# Patient Record
Sex: FEMALE | Race: WHITE | NOT HISPANIC OR LATINO | Employment: STUDENT | URBAN - METROPOLITAN AREA
[De-identification: names, ages, dates, MRNs, and addresses within clinical notes are randomized per-mention and may not be internally consistent; named-entity substitution may affect disease eponyms.]

---
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
## 2024-03-23 ENCOUNTER — APPOINTMENT (OUTPATIENT)
Dept: PEDIATRICS | Facility: CLINIC | Age: 19
End: 2024-03-23
Payer: COMMERCIAL

## 2024-04-05 ENCOUNTER — OFFICE VISIT (OUTPATIENT)
Dept: PRIMARY CARE | Facility: CLINIC | Age: 19
End: 2024-04-05
Payer: COMMERCIAL

## 2024-04-05 VITALS
SYSTOLIC BLOOD PRESSURE: 113 MMHG | TEMPERATURE: 97.6 F | HEIGHT: 64 IN | HEART RATE: 116 BPM | WEIGHT: 149 LBS | BODY MASS INDEX: 25.44 KG/M2 | DIASTOLIC BLOOD PRESSURE: 68 MMHG | OXYGEN SATURATION: 99 %

## 2024-04-05 DIAGNOSIS — J02.9 PHARYNGITIS, UNSPECIFIED ETIOLOGY: Primary | ICD-10-CM

## 2024-04-05 DIAGNOSIS — R21 RASH: ICD-10-CM

## 2024-04-05 LAB — POC RAPID STREP: NEGATIVE

## 2024-04-05 PROCEDURE — 99203 OFFICE O/P NEW LOW 30 MIN: CPT | Performed by: INTERNAL MEDICINE

## 2024-04-05 PROCEDURE — 87081 CULTURE SCREEN ONLY: CPT

## 2024-04-05 PROCEDURE — 87880 STREP A ASSAY W/OPTIC: CPT | Performed by: INTERNAL MEDICINE

## 2024-04-05 PROCEDURE — 86665 EPSTEIN-BARR CAPSID VCA: CPT

## 2024-04-05 PROCEDURE — 86664 EPSTEIN-BARR NUCLEAR ANTIGEN: CPT

## 2024-04-05 PROCEDURE — 80053 COMPREHEN METABOLIC PANEL: CPT

## 2024-04-05 PROCEDURE — 87389 HIV-1 AG W/HIV-1&-2 AB AG IA: CPT

## 2024-04-05 PROCEDURE — 87800 DETECT AGNT MULT DNA DIREC: CPT

## 2024-04-05 PROCEDURE — 86663 EPSTEIN-BARR ANTIBODY: CPT

## 2024-04-05 PROCEDURE — 85025 COMPLETE CBC W/AUTO DIFF WBC: CPT

## 2024-04-05 PROCEDURE — 36415 COLL VENOUS BLD VENIPUNCTURE: CPT

## 2024-04-05 NOTE — PROGRESS NOTES
"Subjective   Rissa Ying is a 19 y.o. female who presents for New Patient Visit.  HPI  Accompanied by her mom.    Past medical history includes recurrent right lower extremity SVT, chronic sinus tachycardia and headaches.    She is concerned about recurrent sore throat.  She went to the [facility] about 2 weeks ago on 3/20/2024 after her 2 days of fever, aches, fatigue, odynophagia and sore throat.  She tested negative for influenza, strep and COVID.  Nevertheless, she was prescribed 10 days of amoxicillin.  After about 4 days she started noticing some improvement but then developed small spots all over her body with some itching which resolved with Benadryl.    This past Monday and Tuesday her throat started getting little bit worse with some visible exudate on her tonsils again and she is concerned about recurrence.  Not nearly as bad as it was and possibly better or stable from when it started on Monday or Tuesday, has some mild discomfort with eating.  This time does not feel sick, no systemic symptoms at all.  Objective   /68   Pulse (!) 116   Temp 36.4 °C (97.6 °F)   Ht 1.613 m (5' 3.5\")   Wt 67.6 kg (149 lb)   SpO2 99%   BMI 25.98 kg/m²    Physical Exam  NAD, pleasant, nontoxic-appearing  No nasal congestion, conjunctivitis, sinus pain or warmth  Enlarged tonsils with some minimal exudate, no significant oropharyngeal erythema  No cervical lymphadenopathy, neck supple  Lungs clear to auscultation  Heart is regular but tachycardic  No visible rashes  Assessment/Plan     She likely recently had viral pharyngitis, possibly mononucleosis given the rash which started after antibiotic treatment.  Other viral etiologies with mild rebound of symptoms are also possible.  Will also rule out other causes of nonstreptococcal pharyngitis as well as any predisposition to recurrent infection.  Otherwise recommend conservative therapy and reassured.  Problem List Items Addressed This Visit  "   None  Visit Diagnoses       Pharyngitis, unspecified etiology    -  Primary    Relevant Orders    CBC and Auto Differential (Completed)    Comprehensive metabolic panel (Completed)    Siva-Barr virus VCA antibody panel (Completed)    HIV 1/2 Antigen/Antibody Screen with Reflex to Confirmation (Completed)    Group A Streptococcus, Culture (Completed)    C. trachomatis / N. gonorrhoeae, DNA probe (Completed)    POCT rapid strep A manually resulted (Completed)    Rash        Relevant Orders    CBC and Auto Differential (Completed)    Comprehensive metabolic panel (Completed)    Siva-Barr virus VCA antibody panel (Completed)    HIV 1/2 Antigen/Antibody Screen with Reflex to Confirmation (Completed)    Group A Streptococcus, Culture (Completed)    C. trachomatis / N. gonorrhoeae, DNA probe (Completed)    POCT rapid strep A manually resulted (Completed)                 Momo Perez MD

## 2024-04-06 LAB
ALBUMIN SERPL BCP-MCNC: 4.8 G/DL (ref 3.4–5)
ALP SERPL-CCNC: 75 U/L (ref 33–110)
ALT SERPL W P-5'-P-CCNC: 18 U/L (ref 7–45)
ANION GAP SERPL CALC-SCNC: 15 MMOL/L (ref 10–20)
AST SERPL W P-5'-P-CCNC: 20 U/L (ref 9–39)
BASOPHILS # BLD AUTO: 0.05 X10*3/UL (ref 0–0.1)
BASOPHILS NFR BLD AUTO: 0.6 %
BILIRUB SERPL-MCNC: 0.4 MG/DL (ref 0–1.2)
BUN SERPL-MCNC: 10 MG/DL (ref 6–23)
C TRACH RRNA SPEC QL NAA+PROBE: NEGATIVE
CALCIUM SERPL-MCNC: 10 MG/DL (ref 8.6–10.6)
CHLORIDE SERPL-SCNC: 103 MMOL/L (ref 98–107)
CO2 SERPL-SCNC: 24 MMOL/L (ref 21–32)
CREAT SERPL-MCNC: 0.62 MG/DL (ref 0.5–1.05)
EBV EA IGG SER QL: NEGATIVE
EBV NA AB SER QL: NEGATIVE
EBV VCA IGG SER IA-ACNC: NEGATIVE
EBV VCA IGM SER IA-ACNC: NEGATIVE
EGFRCR SERPLBLD CKD-EPI 2021: >90 ML/MIN/1.73M*2
EOSINOPHIL # BLD AUTO: 0.13 X10*3/UL (ref 0–0.7)
EOSINOPHIL NFR BLD AUTO: 1.4 %
ERYTHROCYTE [DISTWIDTH] IN BLOOD BY AUTOMATED COUNT: 12.6 % (ref 11.5–14.5)
GLUCOSE SERPL-MCNC: 80 MG/DL (ref 74–99)
HCT VFR BLD AUTO: 45.7 % (ref 36–46)
HGB BLD-MCNC: 14.8 G/DL (ref 12–16)
HIV 1+2 AB+HIV1 P24 AG SERPL QL IA: NONREACTIVE
IMM GRANULOCYTES # BLD AUTO: 0.03 X10*3/UL (ref 0–0.7)
IMM GRANULOCYTES NFR BLD AUTO: 0.3 % (ref 0–0.9)
LYMPHOCYTES # BLD AUTO: 2.67 X10*3/UL (ref 1.2–4.8)
LYMPHOCYTES NFR BLD AUTO: 29.4 %
MCH RBC QN AUTO: 29.4 PG (ref 26–34)
MCHC RBC AUTO-ENTMCNC: 32.4 G/DL (ref 32–36)
MCV RBC AUTO: 91 FL (ref 80–100)
MONOCYTES # BLD AUTO: 0.63 X10*3/UL (ref 0.1–1)
MONOCYTES NFR BLD AUTO: 6.9 %
N GONORRHOEA DNA SPEC QL PROBE+SIG AMP: NEGATIVE
NEUTROPHILS # BLD AUTO: 5.58 X10*3/UL (ref 1.2–7.7)
NEUTROPHILS NFR BLD AUTO: 61.4 %
NRBC BLD-RTO: 0 /100 WBCS (ref 0–0)
PLATELET # BLD AUTO: 371 X10*3/UL (ref 150–450)
POTASSIUM SERPL-SCNC: 4.3 MMOL/L (ref 3.5–5.3)
PROT SERPL-MCNC: 7.8 G/DL (ref 6.4–8.2)
RBC # BLD AUTO: 5.04 X10*6/UL (ref 4–5.2)
SODIUM SERPL-SCNC: 138 MMOL/L (ref 136–145)
WBC # BLD AUTO: 9.1 X10*3/UL (ref 4.4–11.3)

## 2024-04-07 LAB — S PYO THROAT QL CULT: NORMAL

## 2024-06-25 NOTE — PROGRESS NOTES
Subjective   Patient ID: Rissa Ying is a 19 y.o. female who presents for No chief complaint on file..      Objective   Physical Exam    There were no vitals taken for this visit.       Assessment/Plan         Momo Perez MD

## 2024-07-01 ENCOUNTER — DOCUMENTATION (OUTPATIENT)
Dept: HEMATOLOGY/ONCOLOGY | Facility: HOSPITAL | Age: 19
End: 2024-07-01

## 2024-07-01 ENCOUNTER — TELEMEDICINE (OUTPATIENT)
Dept: PRIMARY CARE | Facility: CLINIC | Age: 19
End: 2024-07-01
Payer: COMMERCIAL

## 2024-07-01 DIAGNOSIS — R79.89 ABNORMAL CBC: Primary | ICD-10-CM

## 2024-07-01 DIAGNOSIS — R59.0 CERVICAL LYMPHADENOPATHY: ICD-10-CM

## 2024-07-01 DIAGNOSIS — D75.1 POLYCYTHEMIA: ICD-10-CM

## 2024-07-01 DIAGNOSIS — G44.52 NEW DAILY PERSISTENT HEADACHE: ICD-10-CM

## 2024-07-01 PROCEDURE — 99215 OFFICE O/P EST HI 40 MIN: CPT | Performed by: INTERNAL MEDICINE

## 2024-07-01 NOTE — PROGRESS NOTES
7/1/24 1230  Received referral for this patient from Dr. Streeter. Patient is a 19 year old who was found on CBC to have hgb 16.3 and absolute blasts of 0.22. Patient initially presented for headache and lymph node swelling. Additional labs including EBV were unremarkable. Patient is getting additional labs done today and I will await the results of those in order to determine next steps. I have spoken with patient and she is aware of the plan and that I will be in touch with her. She does have my contact info as well. SHIRA Lewis

## 2024-07-01 NOTE — PROGRESS NOTES
Subjective   Rissa Ying is a 19 y.o. female who presents for No chief complaint on file..  HPI  Accompanied by her mom.    Past medical history includes recurrent right lower extremity SVT, chronic sinus tachycardia and headaches.    She has been having a headache for 9 days straight.  She has a history of headaches but they are usually never lasting for longer than 1 day.  Also severity and quality is different, mostly on the left posterior side and initially some in the back of the neck.  Initially, Motrin, Tylenol and Nurtec were not helpful.  However she recently bought another brand of ibuprofen and took 800 mg and thinks it has been helping somewhat yesterday and today.  She went to the emergency department in [place] on 6/29/2024, they were unable to get IV access to medicate her and did not give her anything orally.  After getting blood test and a CT head, discharged with referral to neurology.    However, her mom had some concerns about abnormal lab tests and indeed there are peripheral blast cells on the CBC which may not have been noticed by the ED doctors.    On discussion today, Rissa states that she has been having some cold/chills at night and bodyaches for 2 out of 3 nights in the middle of last week and has also been feeling hot and cold intermittently, sometimes sweaty.  No recent alcohol use.  Try drinking more coffee without any change in her headache.  She has had a decreased appetite and has lost 5 pounds in the last week and feeling more tired in general but has attributed that to having a chronic headache.    She also noticed a lymph node on the left upper neck, was worried that she had a ear infection but apparently was told she did not.  She notices a swelling like a ball, not painful, not warm or red, rather firm.    CBC shows WBC 7.2, hemoglobin 16.3, platelets 209 absolute blast cells 0.22 K per MCL  UA shows some leuk esterase and pyuria but also with elevated squamous  "cells.  CT head read as \"no significant abnormality\".      Objective   There were no vitals taken for this visit.   Physical Exam  NAD, pleasant, nontoxic-appearing    Assessment/Plan     Persistent headache, peripheral blast cells and generalized symptoms are concerning for possible underlying bone marrow dyscrasia.  -She will need a hematology workup, discussed that she needs to decide if she will be doing that as well as any potential treatment in the future in [place] or in [place]  -In the meantime, I will repeat a CBC with peripheral smear, peripheral flow cytometry, LDH and uric acid.  -Chest x-ray and ultrasound of the lymph node  -Okay to continue conservative therapy with ibuprofen for now  Problem List Items Addressed This Visit    None  Visit Diagnoses       Abnormal CBC        Cervical lymphadenopathy        New daily persistent headache              Time spent includes review of OSH results, prior results and discussion with hematology colleague.         Momo Perez MD   "

## 2024-07-03 ENCOUNTER — TELEMEDICINE (OUTPATIENT)
Dept: PRIMARY CARE | Facility: CLINIC | Age: 19
End: 2024-07-03
Payer: COMMERCIAL

## 2024-07-03 DIAGNOSIS — R79.89 ABNORMAL CBC: ICD-10-CM

## 2024-07-03 DIAGNOSIS — R59.0 CERVICAL LYMPHADENOPATHY: Primary | ICD-10-CM

## 2024-07-03 DIAGNOSIS — G44.52 NEW DAILY PERSISTENT HEADACHE: ICD-10-CM

## 2024-07-03 PROCEDURE — 99213 OFFICE O/P EST LOW 20 MIN: CPT | Performed by: INTERNAL MEDICINE

## 2024-07-03 NOTE — PROGRESS NOTES
"Subjective   Patient ID: Rissa Ying is a 19 y.o. female who presents for No chief complaint on file..    Accompanied by her mom.    Past medical history includes recurrent right lower extremity SVT, chronic sinus tachycardia and headaches.     See recent visit regarding symptoms including headaches, chills and bodyaches, feeling hot and cold, decreased appetite, weight loss and malaise and left cervical lymph node.    Additionally, there is a concern because her CBC was interpreted initially as having peripheral blast cells.  There is also some erythrocytosis.    Rissa reports that except for the headaches, she is feeling better overall.  Advil is helping with the headache during the day but she wakes up with a headache in the morning.  Lymph node is unchanged.    Initially, we are concerned for a possible bone marrow dyscrasia but since then the pathologist at OSH reviewed the CBC with the following assessment:     \"Mild thrombocytopenia without significant schistocytes or platelet   clumps. Atypical mononuclear cells seen, favor reactive lymphocytes.   Consider viral infection as a possible etiology. \"    Repeat CBC on 7/1/2024 showed some mild thrombocytopenia, elevated reactive lymphocytes and normal hemoglobin.  Flow cytometry and her troponin levels were normal.  LDH was slightly elevated, uric acid level was normal.  EBV panel was negative for IgG, IgM capsid antibody was below positive threshold at 29.5 units/mL (<36 neg, 36-44 equiv, >44 pos).  Objective   Physical Exam    NAD, pleasant, nontoxic-appearing     Assessment/Plan     Given updated pathology evaluation of the initial CBC and results of the repeat blood testing, we are much less concerned about bone marrow dyscrasia.  I suspect she had mononucleosis due to EBV as she does have a low-grade positive IgM which may be declining as timing is about 2 weeks or more since onset of symptoms.    -To confirm the suspicion we will have her repeat the " EBV panel in 2 weeks and if the IgG seroconverts to positive this would be confirmatory and mononucleosis would be the most likely cause and would explain all of her symptoms and lab findings.  -We can hold off on ultrasound of the lymph node and chest x-ray for the time being.  -Continue conservative therapy with ibuprofen as needed for now    Momo Perez MD

## 2024-07-09 DIAGNOSIS — J02.9 SORE THROAT: Primary | ICD-10-CM

## 2024-07-09 DIAGNOSIS — J03.90 TONSILLITIS WITH EXUDATE: ICD-10-CM

## 2024-07-10 ENCOUNTER — APPOINTMENT (OUTPATIENT)
Dept: PRIMARY CARE | Facility: CLINIC | Age: 19
End: 2024-07-10
Payer: COMMERCIAL

## 2024-08-02 ENCOUNTER — TELEPHONE (OUTPATIENT)
Dept: OBSTETRICS AND GYNECOLOGY | Facility: CLINIC | Age: 19
End: 2024-08-02
Payer: COMMERCIAL

## 2024-08-12 ENCOUNTER — LAB (OUTPATIENT)
Dept: LAB | Facility: LAB | Age: 19
End: 2024-08-12
Payer: COMMERCIAL

## 2024-08-12 DIAGNOSIS — G44.52 NEW DAILY PERSISTENT HEADACHE: ICD-10-CM

## 2024-08-12 DIAGNOSIS — R59.0 CERVICAL LYMPHADENOPATHY: ICD-10-CM

## 2024-08-12 DIAGNOSIS — R79.89 ABNORMAL CBC: ICD-10-CM

## 2024-08-12 LAB
BASOPHILS # BLD AUTO: 0.02 X10*3/UL (ref 0–0.1)
BASOPHILS NFR BLD AUTO: 0.4 %
EOSINOPHIL # BLD AUTO: 0.09 X10*3/UL (ref 0–0.7)
EOSINOPHIL NFR BLD AUTO: 1.8 %
ERYTHROCYTE [DISTWIDTH] IN BLOOD BY AUTOMATED COUNT: 12.9 % (ref 11.5–14.5)
HCT VFR BLD AUTO: 43.9 % (ref 36–46)
HGB BLD-MCNC: 14.6 G/DL (ref 12–16)
IMM GRANULOCYTES # BLD AUTO: 0.01 X10*3/UL (ref 0–0.7)
IMM GRANULOCYTES NFR BLD AUTO: 0.2 % (ref 0–0.9)
LYMPHOCYTES # BLD AUTO: 2.64 X10*3/UL (ref 1.2–4.8)
LYMPHOCYTES NFR BLD AUTO: 52.5 %
MCH RBC QN AUTO: 28.7 PG (ref 26–34)
MCHC RBC AUTO-ENTMCNC: 33.3 G/DL (ref 32–36)
MCV RBC AUTO: 86 FL (ref 80–100)
MONOCYTES # BLD AUTO: 0.56 X10*3/UL (ref 0.1–1)
MONOCYTES NFR BLD AUTO: 11.1 %
NEUTROPHILS # BLD AUTO: 1.71 X10*3/UL (ref 1.2–7.7)
NEUTROPHILS NFR BLD AUTO: 34 %
NRBC BLD-RTO: 0 /100 WBCS (ref 0–0)
PLATELET # BLD AUTO: 227 X10*3/UL (ref 150–450)
RBC # BLD AUTO: 5.08 X10*6/UL (ref 4–5.2)
WBC # BLD AUTO: 5 X10*3/UL (ref 4.4–11.3)

## 2024-08-12 PROCEDURE — 86665 EPSTEIN-BARR CAPSID VCA: CPT

## 2024-08-12 PROCEDURE — 85025 COMPLETE CBC W/AUTO DIFF WBC: CPT

## 2024-08-12 PROCEDURE — 36415 COLL VENOUS BLD VENIPUNCTURE: CPT

## 2024-08-12 PROCEDURE — 86664 EPSTEIN-BARR NUCLEAR ANTIGEN: CPT

## 2024-08-12 PROCEDURE — 86663 EPSTEIN-BARR ANTIBODY: CPT

## 2024-08-13 LAB
EBV EA IGG SER QL: ABNORMAL
EBV NA AB SER QL: NEGATIVE
EBV VCA IGG SER IA-ACNC: POSITIVE
EBV VCA IGM SER IA-ACNC: POSITIVE

## 2024-08-13 ASSESSMENT — PROMIS GLOBAL HEALTH SCALE
RATE_QUALITY_OF_LIFE: GOOD
RATE_SOCIAL_SATISFACTION: VERY GOOD
RATE_PHYSICAL_HEALTH: GOOD
CARRYOUT_SOCIAL_ACTIVITIES: VERY GOOD
RATE_MENTAL_HEALTH: VERY GOOD
RATE_AVERAGE_FATIGUE: MILD
RATE_AVERAGE_PAIN: 1
RATE_GENERAL_HEALTH: FAIR
EMOTIONAL_PROBLEMS: RARELY
CARRYOUT_PHYSICAL_ACTIVITIES: COMPLETELY

## 2024-08-15 ENCOUNTER — APPOINTMENT (OUTPATIENT)
Dept: PRIMARY CARE | Facility: CLINIC | Age: 19
End: 2024-08-15
Payer: COMMERCIAL

## 2024-08-15 VITALS
HEART RATE: 127 BPM | DIASTOLIC BLOOD PRESSURE: 77 MMHG | BODY MASS INDEX: 25.1 KG/M2 | OXYGEN SATURATION: 99 % | TEMPERATURE: 97.5 F | WEIGHT: 147 LBS | SYSTOLIC BLOOD PRESSURE: 112 MMHG | HEIGHT: 64 IN

## 2024-08-15 DIAGNOSIS — Z00.00 ENCOUNTER FOR WELLNESS EXAMINATION: Primary | ICD-10-CM

## 2024-08-15 DIAGNOSIS — R51.9 FREQUENT HEADACHES: ICD-10-CM

## 2024-08-15 DIAGNOSIS — I82.811 CHRONIC SUPERFICIAL VENOUS THROMBOSIS OF LOWER EXTREMITY, RIGHT: ICD-10-CM

## 2024-08-15 DIAGNOSIS — F90.0 ATTENTION DEFICIT HYPERACTIVITY DISORDER (ADHD), PREDOMINANTLY INATTENTIVE TYPE: ICD-10-CM

## 2024-08-15 DIAGNOSIS — I47.11 INAPPROPRIATE SINUS TACHYCARDIA (CMS-HCC): ICD-10-CM

## 2024-08-15 PROBLEM — I82.819 ACUTE SUPERFICIAL VENOUS THROMBOSIS OF LOWER EXTREMITY: Status: RESOLVED | Noted: 2023-05-02 | Resolved: 2024-08-15

## 2024-08-15 PROCEDURE — 99395 PREV VISIT EST AGE 18-39: CPT | Performed by: INTERNAL MEDICINE

## 2024-08-15 PROCEDURE — 1036F TOBACCO NON-USER: CPT | Performed by: INTERNAL MEDICINE

## 2024-08-15 PROCEDURE — 3008F BODY MASS INDEX DOCD: CPT | Performed by: INTERNAL MEDICINE

## 2024-08-15 PROCEDURE — 99213 OFFICE O/P EST LOW 20 MIN: CPT | Performed by: INTERNAL MEDICINE

## 2024-08-15 ASSESSMENT — ENCOUNTER SYMPTOMS
DEPRESSION: 0
OCCASIONAL FEELINGS OF UNSTEADINESS: 0
LOSS OF SENSATION IN FEET: 0

## 2024-08-15 ASSESSMENT — PATIENT HEALTH QUESTIONNAIRE - PHQ9
1. LITTLE INTEREST OR PLEASURE IN DOING THINGS: NOT AT ALL
2. FEELING DOWN, DEPRESSED OR HOPELESS: NOT AT ALL
SUM OF ALL RESPONSES TO PHQ9 QUESTIONS 1 AND 2: 0

## 2024-08-15 NOTE — PROGRESS NOTES
"Subjective   Rissa Ying is a 19 y.o. female who presents for Annual Exam.  HPI  Accompanied by her mom.    Past medical history includes recurrent right lower extremity SVT, chronic sinus tachycardia and headaches.    See recent visits regarding recurrent sore throat, lab abnormalities and EBV/mononucleosis.  Headaches, sore throats all resolved.    Going back to school, [employer], for third year.  Pre-dental.    Objective   /77 (BP Location: Right arm, Patient Position: Sitting, BP Cuff Size: Adult)   Pulse (!) 127   Temp 36.4 °C (97.5 °F)   Ht 1.613 m (5' 3.5\")   Wt 66.7 kg (147 lb)   LMP  (LMP Unknown)   SpO2 99%   BMI 25.63 kg/m²    Physical Exam  Gen: NAD, pleasant, A&;Ox3  HEENT: PERRL, EOMI, MMM, OP clear  Neck: supple, no thyromegaly, no JVD, normal carotid upstroke  Pulm: lungs CTAB, good air movement  CV: RRR, tachycardic, no m/r/g, 2+ DP pulses  Abd: NABS, soft, NT, ND no HSM  Ext: no peripheral edema  Neuro: CN II-XII intact, no focal sensory or motor deficits, normal reflexes    Assessment/Plan     Chronic tachycardia:  asymptomatic, likely inappropriate sinus tachycardia, possibly familial as mom also had (albeit without cardiology evaluation).  Two prior EKGs reviewed which did not show other abnormalities.  Did not want to have cardiology evaluation when referred by her pediatrician.  Discussed, especially if considering stimulant therapy for ADHD.    ADHD: Discussed my concerns about stimulant therapy.  Intuniv, as an alpha-blocker would be safer and would also potentially help with the sinus tachycardia but she is reluctant due to potential for CNS depression.    Headaches: Continues on amitriptyline for primary prevention.    Recurrent RLE SVT: Had extensive evaluation with heme-onc 7/30/2019.  Follow-up/repeat DRVVT however was not completed.  Note says \"If patient develops a second spontaneous clot she is going to need treatment.\"  She has had close to 30 over the past 5 " years.  Swelling is worse over time.  Most recent last week.  Previously referred to vascular surgery in [place] her father wanted a second opinion first.  -I am not sure if aspirin is indicated or even beneficial in this setting but seems like a low risk alternative for now pending vascular surgery reevaluation referral provided.    Health maintenance  -Mammogram: NA  -Pap: follow-up with GYN  -DEXA: NA  -Last colonoscopy: NA  -Smoking history: never  -Counseled regarding diet and exercise  -Immunizations:  UTD  -Followup for AWV and prn        Problem List Items Addressed This Visit    None             Momo Perez MD

## 2024-08-16 ENCOUNTER — APPOINTMENT (OUTPATIENT)
Dept: NEUROLOGY | Facility: CLINIC | Age: 19
End: 2024-08-16
Payer: COMMERCIAL

## 2024-09-11 ENCOUNTER — APPOINTMENT (OUTPATIENT)
Dept: NEUROLOGY | Facility: CLINIC | Age: 19
End: 2024-09-11
Payer: COMMERCIAL

## 2024-10-20 ENCOUNTER — PATIENT MESSAGE (OUTPATIENT)
Dept: PRIMARY CARE | Facility: CLINIC | Age: 19
End: 2024-10-20
Payer: COMMERCIAL

## 2024-10-20 DIAGNOSIS — L74.513 HYPERHIDROSIS OF SOLES: Primary | ICD-10-CM

## 2024-10-21 PROCEDURE — RXMED WILLOW AMBULATORY MEDICATION CHARGE

## 2024-10-22 ENCOUNTER — PHARMACY VISIT (OUTPATIENT)
Dept: PHARMACY | Facility: CLINIC | Age: 19
End: 2024-10-22
Payer: MEDICARE

## 2024-11-01 ENCOUNTER — APPOINTMENT (OUTPATIENT)
Dept: PRIMARY CARE | Facility: CLINIC | Age: 19
End: 2024-11-01
Payer: COMMERCIAL

## 2024-11-11 DIAGNOSIS — Z30.41 ENCOUNTER FOR SURVEILLANCE OF CONTRACEPTIVE PILLS: ICD-10-CM

## 2024-11-11 RX ORDER — NORETHINDRONE 5 MG/1
5 TABLET ORAL DAILY
Qty: 90 TABLET | Refills: 3 | Status: SHIPPED | OUTPATIENT
Start: 2024-11-11

## 2024-12-16 ENCOUNTER — APPOINTMENT (OUTPATIENT)
Dept: PRIMARY CARE | Facility: CLINIC | Age: 19
End: 2024-12-16
Payer: COMMERCIAL

## 2025-01-02 ENCOUNTER — APPOINTMENT (OUTPATIENT)
Dept: VASCULAR SURGERY | Facility: HOSPITAL | Age: 20
End: 2025-01-02
Payer: COMMERCIAL

## 2025-01-02 ENCOUNTER — APPOINTMENT (OUTPATIENT)
Dept: CARDIOLOGY | Facility: CLINIC | Age: 20
End: 2025-01-02
Payer: COMMERCIAL

## 2025-01-05 NOTE — PROGRESS NOTES
Subjective   Patient ID: Rissa Ying is a 19 y.o. female who presents for Annual Exam.    PAP never, 20 this week.  MAMM never  DEXA never  COLON never  GARDASIL done  Neg GC/CT 4/2024, same partner    New patient. G0. Partner 1 year. 2 total, no STDs. [employer], third year. Chemistry and Biology.    On Amitriptyline for headache prevention. No aura. Taking Norethindrone for birthcontrol. Was on a lower dose but had a period and likes this regimen. No VB. On since 14. Periods were heavy.    Gets superficial blood clots. Always in the same spot. Goes away on own. Plans to see a vascular doctor. Has had 2 or 3 ultrasounds.     Has a fast heart rate. Seeing a cardiologist later this week.    Denies smoking, drugs, occasional ETOH.    mGM brain cancer passed, glioblastoma. No female cancers. Parents are healthy.      Review of Systems   All other systems reviewed and are negative.      Objective   Constitutional: Alert and in no acute distress. Well developed, well nourished.  Neck: no neck asymmetry. Supple and thyroid not enlarged and there were no palpable thyroid nodules.  Chest: Breasts normal appearance, no nipple discharge and no skin changes and palpation of breasts and axillae: no palpable mass and no axillary lymphadenopathy.  Abdomen: soft nontender; no abdominal mass palpated, no organomegaly and no hernias.  Genitourinary: external genitalia: normal, no inguinal lymphadenopathy, Bartholin's urethral and Hartselle's glands: normal, urethra: normal and bladder: normal on palpation.  Vagina: normal.  Cervix: normal.  Uterus: normal.   Right adnexa/parametria: normal.  Left adnexa/parametria: normal.  Skin: normal skin color and pigmentation, normal skin turgor and no rash.  Psychiatric: alert and oriented x 3, affect normal to patient baseline and mood appropriate.     Assessment/Plan   -patient wants to continue with Norethindrone 5mg. Refill placed.  -recent GC/CT  -first PAP smear  -Discussed vit D.

## 2025-01-06 ENCOUNTER — APPOINTMENT (OUTPATIENT)
Dept: OBSTETRICS AND GYNECOLOGY | Facility: CLINIC | Age: 20
End: 2025-01-06
Payer: COMMERCIAL

## 2025-01-06 VITALS
DIASTOLIC BLOOD PRESSURE: 70 MMHG | WEIGHT: 150.2 LBS | SYSTOLIC BLOOD PRESSURE: 130 MMHG | HEIGHT: 64 IN | BODY MASS INDEX: 25.64 KG/M2

## 2025-01-06 DIAGNOSIS — Z12.4 CERVICAL CANCER SCREENING: ICD-10-CM

## 2025-01-06 DIAGNOSIS — Z30.41 ENCOUNTER FOR SURVEILLANCE OF CONTRACEPTIVE PILLS: ICD-10-CM

## 2025-01-06 DIAGNOSIS — Z01.419 WELL WOMAN EXAM WITH ROUTINE GYNECOLOGICAL EXAM: Primary | ICD-10-CM

## 2025-01-06 PROCEDURE — 3008F BODY MASS INDEX DOCD: CPT | Performed by: OBSTETRICS & GYNECOLOGY

## 2025-01-06 PROCEDURE — 99385 PREV VISIT NEW AGE 18-39: CPT | Performed by: OBSTETRICS & GYNECOLOGY

## 2025-01-06 PROCEDURE — 1036F TOBACCO NON-USER: CPT | Performed by: OBSTETRICS & GYNECOLOGY

## 2025-01-06 PROCEDURE — 88175 CYTOPATH C/V AUTO FLUID REDO: CPT

## 2025-01-06 RX ORDER — NORETHINDRONE 5 MG/1
5 TABLET ORAL DAILY
Qty: 90 TABLET | Refills: 3 | Status: SHIPPED | OUTPATIENT
Start: 2025-01-06

## 2025-01-07 PROBLEM — H52.31 ANISOMETROPIA: Status: ACTIVE | Noted: 2025-01-07

## 2025-01-07 PROBLEM — H52.03 HYPEROPIA, BILATERAL: Status: ACTIVE | Noted: 2025-01-07

## 2025-01-07 PROBLEM — H53.021 REFRACTIVE AMBLYOPIA OF RIGHT EYE: Status: ACTIVE | Noted: 2025-01-07

## 2025-01-08 ENCOUNTER — OFFICE VISIT (OUTPATIENT)
Dept: VASCULAR SURGERY | Facility: HOSPITAL | Age: 20
End: 2025-01-08
Payer: COMMERCIAL

## 2025-01-08 ENCOUNTER — APPOINTMENT (OUTPATIENT)
Dept: OPHTHALMOLOGY | Facility: CLINIC | Age: 20
End: 2025-01-08
Payer: COMMERCIAL

## 2025-01-08 VITALS
HEART RATE: 134 BPM | SYSTOLIC BLOOD PRESSURE: 128 MMHG | OXYGEN SATURATION: 98 % | WEIGHT: 149.8 LBS | DIASTOLIC BLOOD PRESSURE: 83 MMHG | BODY MASS INDEX: 26.12 KG/M2

## 2025-01-08 DIAGNOSIS — I82.811 CHRONIC SUPERFICIAL VENOUS THROMBOSIS OF LOWER EXTREMITY, RIGHT: Primary | ICD-10-CM

## 2025-01-08 DIAGNOSIS — H52.31 ANISOMETROPIA: ICD-10-CM

## 2025-01-08 DIAGNOSIS — H53.021 REFRACTIVE AMBLYOPIA OF RIGHT EYE: Primary | ICD-10-CM

## 2025-01-08 DIAGNOSIS — H52.03 HYPEROPIA, BILATERAL: ICD-10-CM

## 2025-01-08 PROCEDURE — FLVLG CONTACT LENS EVAL - LEVEL 2 (SP): Performed by: STUDENT IN AN ORGANIZED HEALTH CARE EDUCATION/TRAINING PROGRAM

## 2025-01-08 PROCEDURE — 99214 OFFICE O/P EST MOD 30 MIN: CPT | Performed by: NURSE PRACTITIONER

## 2025-01-08 PROCEDURE — 92015 DETERMINE REFRACTIVE STATE: CPT | Performed by: STUDENT IN AN ORGANIZED HEALTH CARE EDUCATION/TRAINING PROGRAM

## 2025-01-08 PROCEDURE — 99204 OFFICE O/P NEW MOD 45 MIN: CPT | Performed by: NURSE PRACTITIONER

## 2025-01-08 PROCEDURE — 92004 COMPRE OPH EXAM NEW PT 1/>: CPT | Performed by: STUDENT IN AN ORGANIZED HEALTH CARE EDUCATION/TRAINING PROGRAM

## 2025-01-08 ASSESSMENT — CONF VISUAL FIELD
METHOD: COUNTING FINGERS
OD_NORMAL: 1
OS_INFERIOR_TEMPORAL_RESTRICTION: 0
OS_NORMAL: 1
OD_INFERIOR_TEMPORAL_RESTRICTION: 0
OS_SUPERIOR_NASAL_RESTRICTION: 0
OD_INFERIOR_NASAL_RESTRICTION: 0
OS_INFERIOR_NASAL_RESTRICTION: 0
OD_SUPERIOR_NASAL_RESTRICTION: 0
OS_SUPERIOR_TEMPORAL_RESTRICTION: 0
OD_SUPERIOR_TEMPORAL_RESTRICTION: 0

## 2025-01-08 ASSESSMENT — EXTERNAL EXAM - RIGHT EYE: OD_EXAM: NORMAL

## 2025-01-08 ASSESSMENT — REFRACTION_MANIFEST
OD_AXIS: 180
OD_CYLINDER: -0.50
METHOD_AUTOREFRACTION: 1
OS_AXIS: 010
OS_SPHERE: -0.50
OD_SPHERE: +2.00
OD_AXIS: 170
OS_SPHERE: -0.25
OS_CYLINDER: -0.25
OD_SPHERE: +1.50
OS_CYLINDER: SPHERE
OD_CYLINDER: -0.50

## 2025-01-08 ASSESSMENT — REFRACTION_CURRENTRX
OD_BASECURVE: 8.4
OD_SPHERE: +2.25
OD_BRAND: BIOTRUE 1 DAY FOR ASTIGMATISM
OD_AXIS: 170
OD_CYLINDER: -0.75
OD_DIAMETER: 14.5

## 2025-01-08 ASSESSMENT — SLIT LAMP EXAM - LIDS
COMMENTS: NORMAL
COMMENTS: NORMAL

## 2025-01-08 ASSESSMENT — PAIN SCALES - GENERAL: PAINLEVEL_OUTOF10: 0-NO PAIN

## 2025-01-08 ASSESSMENT — VISUAL ACUITY
OS_SC: 20/20
OD_SC+: -2
METHOD: SNELLEN - LINEAR
OD_SC: 20/25

## 2025-01-08 ASSESSMENT — REFRACTION
OD_AXIS: 175
OD_SPHERE: +2.50
OD_CYLINDER: -0.75
OS_SPHERE: +0.50
OS_CYLINDER: SPHERE

## 2025-01-08 ASSESSMENT — TONOMETRY
IOP_METHOD: GOLDMANN APPLANATION
OD_IOP_MMHG: 17
OS_IOP_MMHG: 18

## 2025-01-08 ASSESSMENT — ENCOUNTER SYMPTOMS
GASTROINTESTINAL NEGATIVE: 0
CARDIOVASCULAR NEGATIVE: 0
MUSCULOSKELETAL NEGATIVE: 0
ALLERGIC/IMMUNOLOGIC NEGATIVE: 0
RESPIRATORY NEGATIVE: 0
NEUROLOGICAL NEGATIVE: 0
ENDOCRINE NEGATIVE: 0
HEMATOLOGIC/LYMPHATIC NEGATIVE: 0
CONSTITUTIONAL NEGATIVE: 0
EYES NEGATIVE: 0
PSYCHIATRIC NEGATIVE: 0

## 2025-01-08 ASSESSMENT — CUP TO DISC RATIO
OS_RATIO: .25
OD_RATIO: .25

## 2025-01-08 ASSESSMENT — EXTERNAL EXAM - LEFT EYE: OS_EXAM: NORMAL

## 2025-01-08 NOTE — PROGRESS NOTES
Assessment/Plan   Diagnoses and all orders for this visit:  Refractive amblyopia of right eye  Anisometropia  Hyperopia, bilateral  -New spec rx released today per patient request. Ocular health wnl for age OU. Monitor 1 year or sooner prn. Refraction billed today.  -Patient with hx of amblyopia with anisometropic spherical factor present right eye  -Patient has been noticing headaches-usually with extended near work/reading  -headaches have improved since being home from college for winter break  -previous contact lens wearer-trials of Biotrue in office today  -will send trials to patient and have her message with results-might need to change to spherical but patient did better today with toric lens  -right eye only  -if headaches dont improve with correction would recommend continued care with PCP    RTC 1 year for annual with DFE and MRX/CL exam

## 2025-01-08 NOTE — Clinical Note
Ludwin Srinivasan,  Can you ship trial lenses right eye only direct to patient. She is going back to college in [place] so we might have to call to get that address. Thanks!

## 2025-01-09 ENCOUNTER — APPOINTMENT (OUTPATIENT)
Dept: CARDIOLOGY | Facility: CLINIC | Age: 20
End: 2025-01-09
Payer: COMMERCIAL

## 2025-01-09 ENCOUNTER — OFFICE VISIT (OUTPATIENT)
Dept: PRIMARY CARE | Facility: CLINIC | Age: 20
End: 2025-01-09
Payer: COMMERCIAL

## 2025-01-09 VITALS
SYSTOLIC BLOOD PRESSURE: 123 MMHG | HEIGHT: 64 IN | TEMPERATURE: 97.5 F | OXYGEN SATURATION: 98 % | BODY MASS INDEX: 25.56 KG/M2 | WEIGHT: 149.7 LBS | HEART RATE: 106 BPM | DIASTOLIC BLOOD PRESSURE: 81 MMHG

## 2025-01-09 VITALS
DIASTOLIC BLOOD PRESSURE: 70 MMHG | SYSTOLIC BLOOD PRESSURE: 100 MMHG | HEIGHT: 64 IN | WEIGHT: 149 LBS | BODY MASS INDEX: 25.44 KG/M2

## 2025-01-09 DIAGNOSIS — G43.009 MIGRAINE WITHOUT AURA AND WITHOUT STATUS MIGRAINOSUS, NOT INTRACTABLE: ICD-10-CM

## 2025-01-09 DIAGNOSIS — R00.2 PALPITATIONS: ICD-10-CM

## 2025-01-09 DIAGNOSIS — I47.11 INAPPROPRIATE SINUS TACHYCARDIA (CMS-HCC): Primary | ICD-10-CM

## 2025-01-09 DIAGNOSIS — R00.0 TACHYCARDIA: ICD-10-CM

## 2025-01-09 DIAGNOSIS — R51.9 FREQUENT HEADACHES: ICD-10-CM

## 2025-01-09 PROCEDURE — 99204 OFFICE O/P NEW MOD 45 MIN: CPT

## 2025-01-09 PROCEDURE — 3008F BODY MASS INDEX DOCD: CPT | Performed by: INTERNAL MEDICINE

## 2025-01-09 PROCEDURE — 93000 ELECTROCARDIOGRAM COMPLETE: CPT

## 2025-01-09 PROCEDURE — 99215 OFFICE O/P EST HI 40 MIN: CPT | Performed by: INTERNAL MEDICINE

## 2025-01-09 PROCEDURE — 1036F TOBACCO NON-USER: CPT

## 2025-01-09 PROCEDURE — 3008F BODY MASS INDEX DOCD: CPT

## 2025-01-09 PROCEDURE — G2211 COMPLEX E/M VISIT ADD ON: HCPCS

## 2025-01-09 PROCEDURE — 1036F TOBACCO NON-USER: CPT | Performed by: INTERNAL MEDICINE

## 2025-01-09 RX ORDER — METOPROLOL SUCCINATE 25 MG/1
25 TABLET, EXTENDED RELEASE ORAL DAILY
Qty: 30 TABLET | Refills: 11 | Status: SHIPPED | OUTPATIENT
Start: 2025-01-09 | End: 2026-01-09

## 2025-01-09 RX ORDER — AMITRIPTYLINE HYDROCHLORIDE 10 MG/1
10 TABLET, FILM COATED ORAL NIGHTLY
Qty: 90 TABLET | Refills: 3 | Status: SHIPPED | OUTPATIENT
Start: 2025-01-09 | End: 2026-01-09

## 2025-01-09 RX ORDER — PROPRANOLOL HYDROCHLORIDE 60 MG/1
60 CAPSULE, EXTENDED RELEASE ORAL DAILY
Qty: 90 CAPSULE | Refills: 3 | Status: SHIPPED | OUTPATIENT
Start: 2025-01-09 | End: 2026-01-09

## 2025-01-09 NOTE — PROGRESS NOTES
"Subjective   Rissa Ying is a 19 y.o. female who presents for Follow-up (PT wants to discuss medication).  HPI  Accompanied by her mom.    Past medical history includes recurrent right lower extremity SVT, chronic sinus tachycardia and headaches.    She did see cardiology today who recommended starting on metoprolol but she does not want to.  Other recommendations include exercise and hydration.  Recommended against stimulants. Also ordered echocardiogram and Holter monitor.    Primarily she is interested in getting a prescription for Nurtec as she thinks it has helped when she has taken her mother's medication for severe headaches.  Estimates about 10 times over the past year.  She says when at school she gets headaches weekly or more, currently on break has only had 1 in the past month.  This is not the same as her chronic mild headaches which she was started on Elavil for and has been taking half tablet of a 25 mg tablet for several years with suspected improvement in severity and frequency it sounds like.  She has also had these headaches for a few years but has previously not really differentiated them.  Describes holoacranial severe pulsating pain, sometimes ibuprofen helps, often not.  Not associated with aura, occasional mild photophobia, no phonophobia, no nausea or vomiting.    See recent visits regarding recurrent sore throat, lab abnormalities and EBV/mononucleosis.  Headaches, sore throats all resolved.  However, today they mention that she continues to have nightly fevers which apparently has been going on for several years as well, as high as 105 °F with facial flushing.  Almost every single night, no aggravating or alleviating factors.    Going back this week for spring semester, [employer], in her third year.  Pre-dental.    Objective   /81 (BP Location: Right arm, Patient Position: Sitting, BP Cuff Size: Adult)   Pulse 106   Temp 36.4 °C (97.5 °F) (Temporal)   Ht 1.613 m (5' 3.5\")  " " Wt 67.9 kg (149 lb 11.2 oz)   LMP  (LMP Unknown) Comment: PATIENT DOES NOT GET PERIODS BC  SpO2 98%   BMI 26.10 kg/m²    Physical Exam  Gen: NAD, pleasant, A&;Ox3  HEENT: PERRL, EOMI, MMM, OP clear  Neck: supple, no thyromegaly, no JVD, normal carotid upstroke  Pulm: lungs CTAB, good air movement  CV: RRR, tachycardic, no m/r/g, 2+ DP pulses  Abd: NABS, soft, NT, ND no HSM  Ext: no peripheral edema  Neuro: CN II-XII intact, no focal sensory or motor deficits, normal reflexes    Assessment/Plan     Chronic tachycardia:  asymptomatic, likely inappropriate sinus tachycardia  - saw Dr. Jacobsen, 1/9/2025  - recommendations include exercise and hydration.  Recommended against stimulants.   - ordered echocardiogram and Holter monitor.  - recommended starting Toprol XL 25mg but Rissa says she will not take it.  However, after discussion of potential prophylaxis against headaches, willing to consider propranolol  - trial of propranolol LA 60mg, monitor HR and HA response  - will update cardiology on change in plan    ADHD: Discussed my concerns about stimulant therapy (echoed by cardiology).  Intuniv, as an alpha-blocker would be safer and would also potentially help with the sinus tachycardia but she is reluctant due to potential for CNS depression.    Headaches: atypical description of migraines but reported response to Nurtec  - Continue on amitriptyline 10mg at bedtime for primary prevention  - add propranolol LA 60mg  - trial of Nurtec for abortive, also consider triptan therapy    Recurrent RLE SVT: Had extensive evaluation with heme-onc 7/30/2019.  Follow-up/repeat DRVVT however was not completed.  Note says \"If patient develops a second spontaneous clot she is going to need treatment.\"  She has had close to 30 over the past 5 years.  Swelling is worse over time.  Most recent last week.  Previously referred to vascular surgery in [place] her father wanted a second opinion first.  -I am not sure if aspirin is " indicated or even beneficial in this setting but seems like a low risk alternative for now pending vascular surgery reevaluation referral provided.    Hx of EBV/heme abnormalities:  - see prior evaluations  - new info provided regarding >3 years of almost nightly fevers and flushing; consider further evaluation    Health maintenance  -Mammogram: NA  -Pap: follow-up with GYN  -DEXA: NA  -Last colonoscopy: NA  -Smoking history: never  -Counseled regarding diet and exercise  -Immunizations:  UTD  -Followup for AWV and prn        Problem List Items Addressed This Visit       Frequent headaches    Relevant Medications    amitriptyline (Elavil) 10 mg tablet    propranolol LA (Inderal LA) 60 mg 24 hr capsule    Inappropriate sinus tachycardia (CMS-HCC) - Primary    Relevant Medications    propranolol LA (Inderal LA) 60 mg 24 hr capsule     Other Visit Diagnoses       Migraine without aura and without status migrainosus, not intractable        Relevant Medications    rimegepant (Nurtec ODT) 75 mg tablet,disintegrating    propranolol LA (Inderal LA) 60 mg 24 hr capsule                   Momo Perez MD

## 2025-01-09 NOTE — PROGRESS NOTES
Location of visit: [address]   Type of Visit: New    Chief Complaint:  Tachycardia    History Of Present Illness:    Rissa Ying is a 19 y.o. female, with history significant for right lower extremity SVT, chronic sinus tachycardia and migraines, who visits Cardiology today as a new patient  for tachycardia.    Goes to college - pre dentistry  No family history of cardiac disease except for inappropriate sinus tach in her mother  No smoking / No drugs / No alcohol  No phisical activity  On Amitriptyline for headache prevention. No aura. Taking Norethindrone for birthcontrol.   Comes here for tachycardia and palpitations.  This is a chronic issue, she had this for years, she had been seen on pediatrics for the same issue.  No prior cardiological study.  Has a recent TSH is normal, no anemia on recent testing.  She denies chest pain, dyspnea on exertion, shortness of breath, orthopnea, PND, nocturia, edema, dizziness, lightheadedness, syncope or claudication.  Blood pressure today: 100/70 mmHg  Today's ECG shows sinus tachycardia at 110 bpm, normal AV conduction, normal QRS and normal ventricular repolarization.    Past Medical History:  She has a past medical history of Acute superficial venous thrombosis of lower extremity (05/02/2023), ADHD (attention deficit hyperactivity disorder), and Headache.    Past Surgical History:  She has a past surgical history that includes Leonard tooth extraction (12/17/2014).    Social History:  She reports that she has never smoked. She has never used smokeless tobacco. She reports that she does not currently use alcohol. She reports that she does not use drugs.    Family History:  No family history on file.  Allergies:  Tree nuts    Outpatient Medications:  Current Outpatient Medications   Medication Instructions    aluminum chloride (Drysol) 20 % external solution Topical, Nightly, Once excessive sweating has stopped, may decrease to once or twice weekly, or as  "needed. Wash treated area in the morning.    amitriptyline (ELAVIL) 25 mg, oral, Nightly    norethindrone (AYGESTIN) 5 mg, oral, Daily     Last Recorded Vitals:  There were no vitals filed for this visit.    Physical Exam:      1/8/2025     1:11 PM 1/8/2025     1:10 PM 1/6/2025    10:09 AM 8/15/2024     5:32 PM 4/5/2024    12:38 PM 6/27/2023     3:56 PM   Vitals   Systolic 128 130 130 112 113 128   Diastolic 83 87 70 77 68 82   BP Location Right arm Left arm  Right arm     Heart Rate  134  127 116 132   Temp    36.4 °C (97.5 °F) 36.4 °C (97.6 °F)    Height   1.613 m (5' 3.5\") 1.613 m (5' 3.5\") 1.613 m (5' 3.5\") 1.613 m (5' 3.5\")   Weight (lb)  149.8 150.2 147 149 144.8   BMI  26.12 kg/m2 26.19 kg/m2 25.63 kg/m2 25.98 kg/m2 25.25 kg/m2   BSA (m2)  1.74 m2 1.75 m2 1.73 m2 1.74 m2 1.72 m2   Visit Report Report    Report Report    Report Report Report Report Report     Wt Readings from Last 5 Encounters:   01/08/25 67.9 kg (149 lb 12.8 oz)   01/06/25 68.1 kg (150 lb 3.2 oz)   08/15/24 66.7 kg (147 lb)   04/05/24 67.6 kg (149 lb)   06/27/23 65.7 kg (144 lb 12.8 oz) (76%, Z= 0.70)*     * Growth percentiles are based on CDC (Girls, 2-20 Years) data.       Physical Exam  Vitals reviewed.   HENT:      Head: Normocephalic.      Mouth/Throat:      Pharynx: Oropharynx is clear.   Eyes:      Pupils: Pupils are equal, round, and reactive to light.   Cardiovascular:      Rate and Rhythm: Normal rate.      Pulses: Normal pulses.      Heart sounds: Normal heart sounds.   Pulmonary:      Effort: Pulmonary effort is normal.      Breath sounds: Normal breath sounds.   Abdominal:      General: Abdomen is flat. Bowel sounds are normal.      Palpations: Abdomen is soft.   Musculoskeletal:         General: Normal range of motion.   Skin:     General: Skin is warm and dry.   Neurological:      General: No focal deficit present.      Mental Status: She is alert.   Psychiatric:         Mood and Affect: Mood normal.              Last Labs " "Reviewed:  CBC -  Recent Labs     08/12/24  1558 04/05/24  1311 06/27/23  1623 02/03/23  1439 08/06/22  1057   WBC 5.0 9.1 6.8 7.0 6.0   HGB 14.6 14.8 14.9 14.8 15.6   HCT 43.9 45.7 45.0 45.7 44.7    371 287 305 262   MCV 86 91 87 88 88     CMP -  Recent Labs     04/05/24  1311 08/06/22  1057    140   K 4.3 4.3    106   CO2 24 23   ANIONGAP 15 15   BUN 10 9   CREATININE 0.62 0.65   EGFR >90  --    CALCIUM 10.0 9.7     Recent Labs     04/05/24  1311 08/06/22  1057   ALBUMIN 4.8 4.5   ALKPHOS 75 75   ALT 18 12   AST 20 15   BILITOT 0.4 0.5     LIPID PANEL -   No results for input(s): \"CHOL\", \"LDLF\", \"LDLCALC\", \"HDL\", \"TRIG\" in the last 96735 hours.  COAGULATION PANEL -  Recent Labs     07/19/19  1218   INR 1.1   FIBRINOGEN 381     HEME/ENDO -  Recent Labs     06/27/23  1623   TSH 1.03     CARDIOVASCULAR  No results for input(s): \"LDH\", \"CKMB\", \"TROPHS\", \"BNP\", \"CRP\" in the last 35104 hours.    No lab exists for component: \"CK\", \"CKMBP\", \"CRPUS\", \"USCRP\"  Last Cardiology/Imaging Tests Personally Reviewed (if images available) and Interpreted:  ECG:  No results found for this or any previous visit (from the past 4464 hours).No results found for this or any previous visit from the past 1095 days.    Echocardiogram:  No results found for this or any previous visit from the past 1825 days.  No results found for this or any previous visit from the past 1095 days.    Cath:  No results found for this or any previous visit from the past 1825 days.  No results found for this or any previous visit from the past 3650 days.  No results found for this or any previous visit from the past 1095 days.    Stress Test:  No results found for this or any previous visit from the past 1825 days.  No results found for this or any previous visit from the past 1095 days.    Cardiac CT/MRI:  No results found for this or any previous visit from the past 1825 days.  No results found for this or any previous visit from the past " 1095 days.    Other CT:      CV RISK FACTORS:   # Hypertension: Last BP:  .  # Hyperlipidemia: Last Tchol No results found for requested labs within last 365 days. / LDL No results found for requested labs within last 365 days. / HDL No results found for requested labs within last 365 days. / TRIG No results found for requested labs within last 365 days. (No results in last year.).  # Type II Diabetes Mellitus: Last A1c No results found for requested labs within last 365 days. (No results in last year.).  # Obesity: Last BMI:  .  # CKD: Last BUN/Cr (GFR): 10/0.62 (>90), 4/5/2024:  1:11 PM.    ASCV RISK:  The ASCVD Risk score (Cesar FONSECA, et al., 2019) failed to calculate for the following reasons:    The 2019 ASCVD risk score is only valid for ages 40 to 79    Assessment/Plan   Rissa Ying is a 19 y.o. female, with history significant for right lower extremity SVT, chronic sinus tachycardia and migraines, who visits Cardiology today as a new patient  for tachycardia.  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 She has been experiencing tachycardia and palpitations for several years, previously followed in pediatrics without advanced cardiac workup.  She denies chest pain, dyspnea on exertion, orthopnea, paroxysmal nocturnal dyspnea, nocturia, edema, dizziness, lightheadedness, syncope, or claudication. She reports no family history of cardiac disease aside from her mother, who has a diagnosis of inappropriate sinus tachycardia.  She does not smoke, use recreational drugs, or consume alcohol. She admits to minimal physical activity. Current medications include amitriptyline for migraine prophylaxis (no associated aura) and norethindrone for birth control. She has a recent normal TSH level and no evidence of anemia on recent laboratory testing.    Assessment  This  is a 19-year-old female with chronic palpitations and a well-documented history of sinus tachycardia. She denies any obstructive cardiac symptoms (chest pain, syncope, or dyspnea). There is a possible familial component given her mother’s history of inappropriate sinus tachycardia. Normal thyroid function and absence of anemia effectively rule out common reversible causes. Amitriptyline use could contribute to tachycardia via anticholinergic effects.    Diagnostics  #Inappropriate Sinus Tachycardia (IST)   - Strong consideration given her chronic sinus tachycardia and maternal history.  Differentials  #Supraventricular Tachycardia (SVT) - Less likely given the normal ECG today but remains on the differential.  #Postural Orthostatic Tachycardia Syndrome (POTS) - Would need orthostatic vital signs or tilt-table testing to evaluate.  #Medication-Related Tachycardia (e.g., amitriptyline effect).  #Anxiety-Related Tachycardia - Could be a contributing factor but not clearly defined here.    Plan and Recommendations  Further Diagnostic Workup  -Echocardiogram: To rule out structural heart disease.  -24- or 48-hour Holter Monitor: To characterize frequency and pattern of tachyarrhythmias and document any non-sinus tachycardia episodes.    Lifestyle Modifications  -I encourage regular, moderate-intensity exercise as tolerated to improve cardiovascular conditioning and possibly reduce resting heart rate.  -Ensure adequate hydration and electrolyte balance.    Medication Review  We will evaluate of a low-dose beta-blocker     Recommendations:  - Echocardiogram  - Holter monitor  - Metoprolol XR 25mg once a day  - Lifestyle Modifications: I encourage regular, moderate-intensity exercise, weight training, as tolerated to improve cardiovascular conditioning and possibly reduce resting heart rate.  -Ensure adequate hydration and electrolyte balance.  - Patient will follow up with me in the Cardiology office once the results are  available  - I spent 45 minutes assessing the case between pre-charting, face-to-face patient interaction, and documentation    Mir Jacobsen MD

## 2025-01-09 NOTE — PATIENT INSTRUCTIONS
- Echocardiogram  - Holter monitor  - Metoprolol XR 25mg once a day  - Lifestyle Modifications: I encourage regular, moderate-intensity exercise, weight training, as tolerated to improve cardiovascular conditioning and possibly reduce resting heart rate.  -Ensure adequate hydration and electrolyte balance.  - Patient will follow up with me in the Cardiology office once the results are available

## 2025-01-10 ENCOUNTER — PHARMACY VISIT (OUTPATIENT)
Dept: PHARMACY | Facility: CLINIC | Age: 20
End: 2025-01-10
Payer: COMMERCIAL

## 2025-01-10 PROCEDURE — RXMED WILLOW AMBULATORY MEDICATION CHARGE

## 2025-01-13 ENCOUNTER — PATIENT MESSAGE (OUTPATIENT)
Dept: PRIMARY CARE | Facility: CLINIC | Age: 20
End: 2025-01-13
Payer: COMMERCIAL

## 2025-01-13 ENCOUNTER — PHARMACY VISIT (OUTPATIENT)
Dept: PHARMACY | Facility: CLINIC | Age: 20
End: 2025-01-13
Payer: COMMERCIAL

## 2025-01-13 DIAGNOSIS — G43.009 MIGRAINE WITHOUT AURA AND WITHOUT STATUS MIGRAINOSUS, NOT INTRACTABLE: Primary | ICD-10-CM

## 2025-01-13 LAB
CYTOLOGY CMNT CVX/VAG CYTO-IMP: NORMAL
LAB AP CONTRACEPTIVE HISTORY: NORMAL
LAB AP HPV GENOTYPE QUESTION: YES
LAB AP HPV HR: NORMAL
LABORATORY COMMENT REPORT: NORMAL
PATH REPORT.TOTAL CANCER: NORMAL

## 2025-01-13 PROCEDURE — RXMED WILLOW AMBULATORY MEDICATION CHARGE

## 2025-01-13 RX ORDER — UBROGEPANT 50 MG/1
50 TABLET ORAL DAILY PRN
Qty: 30 TABLET | Refills: 1 | Status: SHIPPED | OUTPATIENT
Start: 2025-01-13

## 2025-01-15 NOTE — PROGRESS NOTES
Vascular Surgery Clinic Note    CC: NPV     History Of Present Illness:   Rissa Ying is a 20 y.o. female here for initial evaluation. She states she has a history of recurrent right superficial venous thrombosis, first starting in 2019. She denies any personal or family history of DVT/PE. She currently denies any leg pain or swelling. She is on a non-estrogen based oral contraceptive. She denies chest pain or SOB. She does not wear compression stockings. She denies any bulging varicosities. She denies any miscarriages.     Medical History:  Patient Active Problem List   Diagnosis    Abnormal uterine bleeding (AUB)    Attention-deficit/hyperactivity disorder    Breast asymmetry in female    Breast hypoplasia    Frequent headaches    Malar rash    Tachycardia determined by examination of pulse    Inappropriate sinus tachycardia (CMS-HCC)    Chronic superficial venous thrombosis of lower extremity, right    Anisometropia    Refractive amblyopia of right eye    Hyperopia, bilateral        SH:    Social Drivers of Health     Tobacco Use: Low Risk  (1/9/2025)    Patient History     Smoking Tobacco Use: Never     Smokeless Tobacco Use: Never     Passive Exposure: Not on file   Alcohol Use: Not on file   Financial Resource Strain: Not on file   Food Insecurity: Unknown (6/29/2024)    Received from [employer]    Food Insecurities     Worried about running out of food: Not on file     Food Bought: Not on file   Transportation Needs: Unknown (6/29/2024)    Received from [employer] [facility]    Transportation     Worried about transportation: Not on file   Physical Activity: Not on file   Stress: Not on file   Social Connections: Not on file   Intimate Partner Violence: Unknown (6/29/2024)    Received from [employer] [facility]    Interpersonal Safety     Feel physically or emotionally unsafe where currently live: Not on file     Harm by anyone: Not on  file     Emotionally Harmed: Not on file   Depression: Not at risk (8/15/2024)    PHQ-2     PHQ-2 Score: 0   Housing Stability: Unknown (6/29/2024)    Received from [facility]    Housing/Utilities     Worried about losing home: Not on file     Stayed outside house: Not on file     Unable to get utilities: Not on file   Utilities: Unknown (6/29/2024)    Received from [facility] [employer] [facility]    Utilities     Worried about losing home: Not on file     Stayed outside house: Not on file     Unable to get utilities: Not on file   [employer]: Not on file   Health Literacy: Not on file        FH:  No family history on file.     Allergies:   Allergies   Allergen Reactions    Tree Nuts Anaphylaxis       ROS:  All systems were reviewed and noted to be negative, other than described above.     Objective:  Last Recorded Vitals  Blood pressure 128/83, pulse (!) 134, weight 67.9 kg (149 lb 12.8 oz), SpO2 98%.    Meds:   Current Outpatient Medications   Medication Instructions    aluminum chloride (Drysol) 20 % external solution Topical, Nightly, Once excessive sweating has stopped, may decrease to once or twice weekly, or as needed. Wash treated area in the morning.    amitriptyline (ELAVIL) 10 mg, oral, Nightly    metoprolol succinate XL (TOPROL-XL) 25 mg, oral, Daily, Do not crush or chew.    norethindrone (AYGESTIN) 5 mg, oral, Daily    propranolol LA (INDERAL LA) 60 mg, oral, Daily, Do not crush, chew, or split.    Ubrelvy 50 mg, oral, Daily PRN       Exam:  Constitutional: Well appearing, NAD   PSYCH: Appropriate mood and affect  Eyes: Sclera clear   Neck: Supple   CV: No tachycardia   RESP: Unlabored breathing   GI: Soft, nontender, non-distended.   SKIN: No lesions  NEURO: No focal deficits noted. Motor/sensory intact.   EXTREMITIES: Warm & well perfused. No leg edema - both calves were measured in office today. No evidence of arterial ischemia. No evidence of venous  insufficiency or varicose veins.   PULSES: Normal pulse exam throughout.     Assessment & Plan:  1. Chronic superficial venous thrombosis of lower extremity, right  Vascular US Lower Extremity Venous Insufficiency Bilateral        History of recurrent superficial venous thrombosis.   Only study available for my viewing from 2019.   No current symptoms.   Recommend compression stockings as needed   Obtain VI study --> will call with results/plan     RODGER Carcamo-CNP

## 2025-01-16 ENCOUNTER — DOCUMENTATION (OUTPATIENT)
Dept: OPHTHALMOLOGY | Facility: CLINIC | Age: 20
End: 2025-01-16
Payer: COMMERCIAL

## 2025-01-16 ASSESSMENT — REFRACTION_CURRENTRX
OD_BRAND: BIOTRUE 1 DAY
OD_BASECURVE: 8.6
OD_DIAMETER: 14.2
OD_SPHERE: +1.75
OD_CYLINDER: SPHERE

## 2025-01-16 NOTE — PROGRESS NOTES
Patient struggling with Toric CL for OD only. Will switch to spherical and see if this helps improve binocular vision.

## 2025-01-16 NOTE — Clinical Note
Ludwin Srinivasan, can we send trials of the Biotrue lens above. I think we have to send them to her college address again. Thanks!

## 2025-01-29 ENCOUNTER — DOCUMENTATION (OUTPATIENT)
Dept: OPHTHALMOLOGY | Facility: CLINIC | Age: 20
End: 2025-01-29
Payer: COMMERCIAL

## 2025-01-29 ASSESSMENT — REFRACTION_CURRENTRX
OD_SPHERE: +1.75
OD_BRAND: BIOTRUE 1 DAY
OD_CYLINDER: SPHERE
OD_BASECURVE: 8.6
OD_DIAMETER: 14.2

## 2025-01-29 NOTE — PROGRESS NOTES
Patient doing much better with spherical contact lens right eye only. Will finalize this prescription. She reports noticing blur (if only looking with her right eye) while wearing it which I suspect is in part her adapting to latent hyperopia and being corrected on the right eye. She reports clear and comfortable vision with both eyes open. Patient also reports a decrease in headaches since wearing the lens.

## 2025-01-30 ENCOUNTER — TELEPHONE (OUTPATIENT)
Dept: OPHTHALMOLOGY | Facility: CLINIC | Age: 20
End: 2025-01-30
Payer: COMMERCIAL

## 2025-02-03 ENCOUNTER — TELEPHONE (OUTPATIENT)
Dept: OPHTHALMOLOGY | Facility: CLINIC | Age: 20
End: 2025-02-03
Payer: COMMERCIAL

## 2025-02-07 PROCEDURE — RXMED WILLOW AMBULATORY MEDICATION CHARGE

## 2025-02-11 ENCOUNTER — PHARMACY VISIT (OUTPATIENT)
Dept: PHARMACY | Facility: CLINIC | Age: 20
End: 2025-02-11
Payer: COMMERCIAL

## 2025-02-17 DIAGNOSIS — N89.8 VAGINAL ODOR: Primary | ICD-10-CM

## 2025-02-17 RX ORDER — METRONIDAZOLE 7.5 MG/G
GEL VAGINAL DAILY
Qty: 70 G | Refills: 0 | Status: SHIPPED | OUTPATIENT
Start: 2025-02-17 | End: 2025-02-22

## 2025-02-17 NOTE — PROGRESS NOTES
Patient is at school 2 1/2 hours away. Since her annual with me she has noticed a discharge(white) and odor. I explained that I usually prefer to do a culture but since she can't get here will treat with Metrogel. If this does not work she will need to be seen.

## 2025-03-07 ENCOUNTER — APPOINTMENT (OUTPATIENT)
Dept: PRIMARY CARE | Facility: CLINIC | Age: 20
End: 2025-03-07
Payer: COMMERCIAL

## 2025-03-14 PROCEDURE — RXMED WILLOW AMBULATORY MEDICATION CHARGE

## 2025-03-15 PROCEDURE — RXMED WILLOW AMBULATORY MEDICATION CHARGE

## 2025-03-16 ENCOUNTER — PHARMACY VISIT (OUTPATIENT)
Dept: PHARMACY | Facility: CLINIC | Age: 20
End: 2025-03-16
Payer: COMMERCIAL

## 2025-03-17 ENCOUNTER — PHARMACY VISIT (OUTPATIENT)
Dept: PHARMACY | Facility: CLINIC | Age: 20
End: 2025-03-17
Payer: COMMERCIAL

## 2025-05-05 ENCOUNTER — APPOINTMENT (OUTPATIENT)
Dept: PRIMARY CARE | Facility: CLINIC | Age: 20
End: 2025-05-05
Payer: COMMERCIAL

## 2025-05-05 VITALS
BODY MASS INDEX: 25.44 KG/M2 | HEIGHT: 64 IN | SYSTOLIC BLOOD PRESSURE: 128 MMHG | OXYGEN SATURATION: 99 % | DIASTOLIC BLOOD PRESSURE: 83 MMHG | WEIGHT: 149 LBS | HEART RATE: 109 BPM

## 2025-05-05 DIAGNOSIS — L98.9 SKIN LESION OF FOOT: Primary | ICD-10-CM

## 2025-05-05 PROCEDURE — 99213 OFFICE O/P EST LOW 20 MIN: CPT | Performed by: INTERNAL MEDICINE

## 2025-05-05 PROCEDURE — 3008F BODY MASS INDEX DOCD: CPT | Performed by: INTERNAL MEDICINE

## 2025-05-05 PROCEDURE — 1036F TOBACCO NON-USER: CPT | Performed by: INTERNAL MEDICINE

## 2025-05-05 ASSESSMENT — PATIENT HEALTH QUESTIONNAIRE - PHQ9
SUM OF ALL RESPONSES TO PHQ9 QUESTIONS 1 AND 2: 0
1. LITTLE INTEREST OR PLEASURE IN DOING THINGS: NOT AT ALL
2. FEELING DOWN, DEPRESSED OR HOPELESS: NOT AT ALL

## 2025-05-05 ASSESSMENT — COLUMBIA-SUICIDE SEVERITY RATING SCALE - C-SSRS
2. HAVE YOU ACTUALLY HAD ANY THOUGHTS OF KILLING YOURSELF?: NO
6. HAVE YOU EVER DONE ANYTHING, STARTED TO DO ANYTHING, OR PREPARED TO DO ANYTHING TO END YOUR LIFE?: NO
1. IN THE PAST MONTH, HAVE YOU WISHED YOU WERE DEAD OR WISHED YOU COULD GO TO SLEEP AND NOT WAKE UP?: NO

## 2025-05-05 NOTE — PROGRESS NOTES
Subjective   Rissa Ying is a 20 y.o. female who presents for Rash (Since October right foot).  Rash      Accompanied by her mom.    First noticed a spot about October 2024, no recollection of any trauma, injury or bite.  If not for the visible appearance, would not know what it was there as it is completely asymptomatic.  Has grown over time, slowly.  There is some pressure from her shoes but she says she frequently wears different shoes and different socks.  She does sleep flat on her back with her dorsum of her feet flat against the bed.  No other potential external factors that we could come up with.      **COPIED FORWARD FOR REFERENCE**    Past medical history includes recurrent right lower extremity SVT, chronic sinus tachycardia and headaches.    She did see cardiology today who recommended starting on metoprolol but she does not want to.  Other recommendations include exercise and hydration.  Recommended against stimulants. Also ordered echocardiogram and Holter monitor.    Primarily she is interested in getting a prescription for Nurtec as she thinks it has helped when she has taken her mother's medication for severe headaches.  Estimates about 10 times over the past year.  She says when at school she gets headaches weekly or more, currently on break has only had 1 in the past month.  This is not the same as her chronic mild headaches which she was started on Elavil for and has been taking half tablet of a 25 mg tablet for several years with suspected improvement in severity and frequency it sounds like.  She has also had these headaches for a few years but has previously not really differentiated them.  Describes holoacranial severe pulsating pain, sometimes ibuprofen helps, often not.  Not associated with aura, occasional mild photophobia, no phonophobia, no nausea or vomiting.    See recent visits regarding recurrent sore throat, lab abnormalities and EBV/mononucleosis.  Headaches, sore throats all  "resolved.  However, today they mention that she continues to have nightly fevers which apparently has been going on for several years as well, as high as 105 °F with facial flushing.  Almost every single night, no aggravating or alleviating factors.    Going back this week for spring semester, [place], in her third year.  Pre-dental.    Objective   /83   Pulse 109   Ht 1.626 m (5' 4\")   Wt 67.6 kg (149 lb)   LMP  (LMP Unknown) Comment: no cycle due to birth control  SpO2 99%   BMI 25.58 kg/m²    Physical Exam  Gen: NAD, pleasant, A&;Ox3  HEENT: PERRL, EOMI, MMM, OP clear  Neck: supple, no thyromegaly, no JVD, normal carotid upstroke  Pulm: lungs CTAB, good air movement  CV: RRR, tachycardic, no m/r/g, 2+ DP pulses  Abd: NABS, soft, NT, ND no HSM  Ext: no peripheral edema  Neuro: CN II-XII intact, no focal sensory or motor deficits, normal reflexes    Skin dorsal right foot: Slightly irregular bordered faint macule appearing slightly below the surface of the skin with some hyperpigmented areas as well as some palpable atrophic areas, see media tab    Assessment/Plan     Skin lesion: Appears likely may be from chronic pressure of the area as there is some mild hyperpigmentation as well as atrophy.  Given her other medical history I would consider other, less common, causes including morphea, necrobiosis lipoidica, lichen sclerosis etc.  Discussed referral to dermatology with patient and mom and they will consider.      **COPIED FORWARD FOR REFERENCE**      Chronic tachycardia:  asymptomatic, likely inappropriate sinus tachycardia  - saw Dr. Jacobsen, 1/9/2025  - recommendations include exercise and hydration.  Recommended against stimulants.   - ordered echocardiogram and Holter monitor.  - recommended starting Toprol XL 25mg but Rissa says she will not take it.  However, after discussion of potential prophylaxis against headaches, willing to consider propranolol  - trial of propranolol LA 60mg, " "monitor HR and HA response  - will update cardiology on change in plan    ADHD: Discussed my concerns about stimulant therapy (echoed by cardiology).  Intuniv, as an alpha-blocker would be safer and would also potentially help with the sinus tachycardia but she is reluctant due to potential for CNS depression.    Headaches: atypical description of migraines but reported response to Nurtec  - Continue on amitriptyline 10mg at bedtime for primary prevention  - add propranolol LA 60mg  - trial of Nurtec for abortive, also consider triptan therapy    Recurrent RLE SVT: Had extensive evaluation with heme-onc 7/30/2019.  Follow-up/repeat DRVVT however was not completed.  Note says \"If patient develops a second spontaneous clot she is going to need treatment.\"  She has had close to 30 over the past 5 years.  Swelling is worse over time.  Most recent last week.  Previously referred to vascular surgery in [place] her father wanted a second opinion first.  -I am not sure if aspirin is indicated or even beneficial in this setting but seems like a low risk alternative for now pending vascular surgery reevaluation referral provided.    Hx of EBV/heme abnormalities:  - see prior evaluations  - new info provided regarding >3 years of almost nightly fevers and flushing; consider further evaluation    Health maintenance  -Mammogram: NA  -Pap: follow-up with GYN  -DEXA: NA  -Last colonoscopy: NA  -Smoking history: never  -Counseled regarding diet and exercise  -Immunizations:  UTD  -Followup for AWV and prn        Problem List Items Addressed This Visit    None               Moom Perez MD   "

## 2025-05-06 ENCOUNTER — OFFICE VISIT (OUTPATIENT)
Dept: GASTROENTEROLOGY | Facility: HOSPITAL | Age: 20
End: 2025-05-06
Payer: COMMERCIAL

## 2025-05-06 VITALS — HEIGHT: 64 IN | WEIGHT: 151 LBS | BODY MASS INDEX: 25.78 KG/M2

## 2025-05-06 DIAGNOSIS — K60.2 ANAL FISSURE: ICD-10-CM

## 2025-05-06 DIAGNOSIS — R14.0 BLOATING: ICD-10-CM

## 2025-05-06 DIAGNOSIS — K62.5 RECTAL BLEEDING: Primary | ICD-10-CM

## 2025-05-06 PROCEDURE — 3008F BODY MASS INDEX DOCD: CPT | Performed by: INTERNAL MEDICINE

## 2025-05-06 PROCEDURE — 99242 OFF/OP CONSLTJ NEW/EST SF 20: CPT | Performed by: INTERNAL MEDICINE

## 2025-05-06 PROCEDURE — RXMED WILLOW AMBULATORY MEDICATION CHARGE

## 2025-05-06 RX ORDER — SODIUM, POTASSIUM,MAG SULFATES 17.5-3.13G
SOLUTION, RECONSTITUTED, ORAL ORAL
Qty: 1 EACH | Refills: 0 | Status: SHIPPED | OUTPATIENT
Start: 2025-05-06

## 2025-05-06 ASSESSMENT — ENCOUNTER SYMPTOMS
ALLERGIC/IMMUNOLOGIC NEGATIVE: 1
HEMATOLOGIC/LYMPHATIC NEGATIVE: 1
CARDIOVASCULAR NEGATIVE: 1
PSYCHIATRIC NEGATIVE: 1
ENDOCRINE NEGATIVE: 1
MUSCULOSKELETAL NEGATIVE: 1
CONSTITUTIONAL NEGATIVE: 1
EYES NEGATIVE: 1
GASTROINTESTINAL NEGATIVE: 1
NEUROLOGICAL NEGATIVE: 1
RESPIRATORY NEGATIVE: 1

## 2025-05-06 NOTE — PROGRESS NOTES
Rectal bleeding  Anal fissure  History of bloating    The referring doctor is Dr. Momo jimenez    History of Present Illness:   Rissa Ying is a 20 y.o. female with PMHx of migraines, ADHD, chronic superficial phlebitis, and who presents to GI clinic for  consultation.  She has had chronic rectal bleeding, constipation, straining, bloating, and ongoing symptoms that requires treatment of an anal fissure.  She will have a bowel movement after eating at all times.  She has developed rectal bleeding in the bowl as well as with wiping.  The bleeding is painless.  She denies fever, chills or weight loss.  She also has bloating.  She has been on amitriptyline 5 years for migraines.  She has no other complaints.    Her labs from last year are reviewed which are normal.    Her thyroid was also checked in the past normal.    Her medications are reviewed    She further has a history of tachycardia and takes metoprolol at times.    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    She has no other complaints her mother is with her and all their questions were answered.    Review of Systems  Review of Systems   Constitutional: Negative.    HENT: Negative.     Eyes: Negative.    Respiratory: Negative.     Cardiovascular: Negative.    Gastrointestinal: Negative.    Endocrine: Negative.    Genitourinary: Negative.    Musculoskeletal: Negative.    Skin: Negative.    Allergic/Immunologic: Negative.    Neurological: Negative.    Hematological: Negative.    Psychiatric/Behavioral: Negative.     All other systems reviewed and are negative.      Allergies  RX Allergies[1]    Medications  Current Outpatient Medications   Medication Instructions    aluminum chloride (Drysol) 20 % external solution Topical, Nightly, Once excessive sweating has stopped, may decrease to once or twice weekly, or as needed. Wash treated area in the morning.    amitriptyline (ELAVIL) 10 mg, oral, Nightly    metoprolol succinate XL (TOPROL-XL) 25  mg, oral, Daily, Do not crush or chew.    norethindrone (AYGESTIN) 5 mg, oral, Daily    propranolol LA (INDERAL LA) 60 mg, oral, Daily, Do not crush, chew, or split.    Ubrelvy 50 mg, oral, Daily PRN        Objective   There were no vitals taken for this visit.   Physical Exam  Constitutional:       Appearance: Normal appearance.   Eyes:      Conjunctiva/sclera: Conjunctivae normal.   Cardiovascular:      Rate and Rhythm: Normal rate and regular rhythm.   Pulmonary:      Effort: Pulmonary effort is normal.      Breath sounds: Normal breath sounds.   Abdominal:      General: Abdomen is flat. Bowel sounds are normal.      Palpations: Abdomen is soft.   Musculoskeletal:         General: Normal range of motion.      Cervical back: Normal range of motion.   Neurological:      Mental Status: She is alert.           Lab Results   Component Value Date    WBC 5.0 08/12/2024    WBC 9.1 04/05/2024    WBC 6.8 06/27/2023    HGB 14.6 08/12/2024    HGB 14.8 04/05/2024    HGB 14.9 06/27/2023    HCT 43.9 08/12/2024    HCT 45.7 04/05/2024    HCT 45.0 06/27/2023     08/12/2024     04/05/2024     06/27/2023     Lab Results   Component Value Date     04/05/2024     08/06/2022    K 4.3 04/05/2024    K 4.3 08/06/2022     04/05/2024     08/06/2022    CO2 24 04/05/2024    CO2 23 08/06/2022    BUN 10 04/05/2024    BUN 9 08/06/2022    CREATININE 0.62 04/05/2024    CREATININE 0.65 08/06/2022    CALCIUM 10.0 04/05/2024    CALCIUM 9.7 08/06/2022    PROT 7.8 04/05/2024    PROT 7.4 08/06/2022    BILITOT 0.4 04/05/2024    BILITOT 0.5 08/06/2022    ALKPHOS 75 04/05/2024    ALKPHOS 75 08/06/2022    ALT 18 04/05/2024    ALT 12 08/06/2022    AST 20 04/05/2024    AST 15 08/06/2022    GLUCOSE 80 04/05/2024    GLUCOSE 78 08/06/2022           Rissa Ying is a 20 y.o. female who presents to GI clinic for rectal bleeding, bloating and history of anal fissure.  The bloating could be a symptom of irritable  bowel syndrome which we will differentiate colonoscopy.  We will wean off amitriptyline to see if this helps with her regularity in terms of bowel movements.     Rectal bleeding  Patient is a 20-year-old with a history of rectal bleeding, history of anal fissure, and bloating who has chronic issues and probably more constipation.  She is here today due to the chronicity of the symptoms.  She has had blood work 1 year ago showing no anemia.  She denies any nausea, vomiting, dysphagia or weight loss.  Due to the chronicity of the symptoms I am suggesting we pursue colonoscopy to rule out inflammatory bowel disease versus IBS.  I discussed the procedure, sedation and recovery with she and her mother.  They are agreeable we will schedule.       Bi Molina MD              [1]   Allergies  Allergen Reactions    Tree Nuts Anaphylaxis

## 2025-05-06 NOTE — ASSESSMENT & PLAN NOTE
Patient is a 20-year-old with a history of rectal bleeding, history of anal fissure, and bloating who has chronic issues and probably more constipation.  She is here today due to the chronicity of the symptoms.  She has had blood work 1 year ago showing no anemia.  She denies any nausea, vomiting, dysphagia or weight loss.  Due to the chronicity of the symptoms I am suggesting we pursue colonoscopy to rule out inflammatory bowel disease versus IBS.  I discussed the procedure, sedation and recovery with she and her mother.  They are agreeable we will schedule.

## 2025-05-09 ENCOUNTER — PHARMACY VISIT (OUTPATIENT)
Dept: PHARMACY | Facility: CLINIC | Age: 20
End: 2025-05-09
Payer: COMMERCIAL

## 2025-05-09 PROCEDURE — RXMED WILLOW AMBULATORY MEDICATION CHARGE

## 2025-05-12 ENCOUNTER — APPOINTMENT (OUTPATIENT)
Dept: GASTROENTEROLOGY | Facility: EXTERNAL LOCATION | Age: 20
End: 2025-05-12
Payer: COMMERCIAL

## 2025-05-12 DIAGNOSIS — K62.5 RECTAL BLEEDING: ICD-10-CM

## 2025-05-12 DIAGNOSIS — K60.2 ANAL FISSURE: ICD-10-CM

## 2025-05-12 DIAGNOSIS — R14.0 BLOATING: ICD-10-CM

## 2025-05-12 DIAGNOSIS — K62.5 RECTAL BLEEDING: Primary | ICD-10-CM

## 2025-05-12 DIAGNOSIS — K50.10 CROHN'S DISEASE OF COLON WITHOUT COMPLICATION (MULTI): ICD-10-CM

## 2025-05-12 PROCEDURE — 88305 TISSUE EXAM BY PATHOLOGIST: CPT

## 2025-05-12 PROCEDURE — 88305 TISSUE EXAM BY PATHOLOGIST: CPT | Performed by: STUDENT IN AN ORGANIZED HEALTH CARE EDUCATION/TRAINING PROGRAM

## 2025-05-12 PROCEDURE — 45380 COLONOSCOPY AND BIOPSY: CPT | Performed by: INTERNAL MEDICINE

## 2025-05-12 NOTE — PROGRESS NOTES
Colonoscopy for rectal bleeding showed hemorrhoids and I suspect the cause of bleeding is hemorrhoids or anal fissure.  He should minimize constipation with Metamucil daily and if no good bowel movement MiraLAX.    There was some mild erosions in the ileum and around the appendix it could be consistent with minimal Crohn's disease.  Biopsies are done.  At this point with the minimal involvement I would hold on medication.  I would see her on a regular basis to follow her clinically

## 2025-05-13 ENCOUNTER — PHARMACY VISIT (OUTPATIENT)
Dept: PHARMACY | Facility: CLINIC | Age: 20
End: 2025-05-13
Payer: COMMERCIAL

## 2025-05-14 ENCOUNTER — LAB REQUISITION (OUTPATIENT)
Dept: LAB | Facility: HOSPITAL | Age: 20
End: 2025-05-14
Payer: COMMERCIAL

## 2025-05-14 DIAGNOSIS — K62.5 HEMORRHAGE OF ANUS AND RECTUM: ICD-10-CM

## 2025-05-26 LAB
LABORATORY COMMENT REPORT: NORMAL
PATH REPORT.FINAL DX SPEC: NORMAL
PATH REPORT.GROSS SPEC: NORMAL
PATH REPORT.RELEVANT HX SPEC: NORMAL
PATH REPORT.TOTAL CANCER: NORMAL

## 2025-05-27 ENCOUNTER — TELEPHONE (OUTPATIENT)
Dept: GASTROENTEROLOGY | Facility: HOSPITAL | Age: 20
End: 2025-05-27
Payer: COMMERCIAL

## 2025-05-27 NOTE — TELEPHONE ENCOUNTER
I discussed the biopsy with the patient that showed possible mild Crohn's in the cecum.  She is otherwise completely asymptomatic other than occasional rectal bleeding that is rectal pain consistent with her fissure.  No other intervention.  I have answered all her questions, mother's questions as well as her father's questions.

## 2025-06-02 PROCEDURE — RXMED WILLOW AMBULATORY MEDICATION CHARGE

## 2025-06-06 ENCOUNTER — PHARMACY VISIT (OUTPATIENT)
Dept: PHARMACY | Facility: CLINIC | Age: 20
End: 2025-06-06
Payer: COMMERCIAL

## 2025-06-19 ENCOUNTER — APPOINTMENT (OUTPATIENT)
Dept: OPHTHALMOLOGY | Facility: CLINIC | Age: 20
End: 2025-06-19
Payer: COMMERCIAL

## 2025-06-19 DIAGNOSIS — H52.31 ANISOMETROPIA: ICD-10-CM

## 2025-06-19 DIAGNOSIS — H53.021 REFRACTIVE AMBLYOPIA OF RIGHT EYE: Primary | ICD-10-CM

## 2025-06-19 DIAGNOSIS — H52.03 HYPEROPIA, BILATERAL: ICD-10-CM

## 2025-06-19 PROCEDURE — 92310 CONTACT LENS FITTING OU: CPT | Performed by: OPTOMETRIST

## 2025-06-19 PROCEDURE — 99212 OFFICE O/P EST SF 10 MIN: CPT | Performed by: OPTOMETRIST

## 2025-06-19 ASSESSMENT — VISUAL ACUITY
CORRECTION_TYPE: CONTACTS
OD_CC: 20/30+1
METHOD: SNELLEN - LINEAR
VA_OR_OD_CURRENT_RX: 20/25
OS_SC: 20/20

## 2025-06-19 ASSESSMENT — REFRACTION_CURRENTRX
OD_AXIS: 180
OD_DIAMETER: 14.5
OD_BASECURVE: 8.6
OD_SPHERE: +1.75
OD_CYLINDER: SPHERE
OD_BRAND: BIOTRUE 1 DAY
OD_BASECURVE: 8.4
OD_CYLINDER: -0.75
OD_BRAND: BIOTRUE 1 DAY FOR ASTIGMATISM
OD_SPHERE: +1.75
OD_DIAMETER: 14.2

## 2025-06-19 ASSESSMENT — ENCOUNTER SYMPTOMS
RESPIRATORY NEGATIVE: 0
ALLERGIC/IMMUNOLOGIC NEGATIVE: 0
EYES NEGATIVE: 1
MUSCULOSKELETAL NEGATIVE: 0
CARDIOVASCULAR NEGATIVE: 0
PSYCHIATRIC NEGATIVE: 0
NEUROLOGICAL NEGATIVE: 0
ENDOCRINE NEGATIVE: 0
GASTROINTESTINAL NEGATIVE: 0
CONSTITUTIONAL NEGATIVE: 0
HEMATOLOGIC/LYMPHATIC NEGATIVE: 0

## 2025-06-19 ASSESSMENT — REFRACTION_MANIFEST
OD_AXIS: 175
OD_SPHERE: +1.25
OS_AXIS: 070
OD_SPHERE: +1.75
OD_AXIS: 180
OD_CYLINDER: -0.50
METHOD_AUTOREFRACTION: 1
OS_CYLINDER: -0.25
OS_CYLINDER: SPHERE
OS_SPHERE: +0.25
OD_CYLINDER: -0.75
OS_SPHERE: +0.25

## 2025-06-19 ASSESSMENT — SLIT LAMP EXAM - LIDS
COMMENTS: NORMAL
COMMENTS: NORMAL

## 2025-06-19 ASSESSMENT — TONOMETRY
OD_IOP_MMHG: 19
IOP_METHOD: GOLDMANN APPLANATION
OS_IOP_MMHG: 19

## 2025-06-19 ASSESSMENT — EXTERNAL EXAM - LEFT EYE: OS_EXAM: NORMAL

## 2025-06-19 ASSESSMENT — EXTERNAL EXAM - RIGHT EYE: OD_EXAM: NORMAL

## 2025-06-19 NOTE — PROGRESS NOTES
Assessment/Plan   Diagnoses and all orders for this visit:  Refractive amblyopia of right eye  Anisometropia  Hyperopia, bilateral    Headaches, previously assumed to be secondary to uncorrected refractive error and anisometropia were resolved with spherical CL use in Right eye - presume that recurrence of HA now due to additional uncorrected astigmatism OD    Refit Right eye to toric CL   Dispense trial for Right eye     If satisfactory and HA improves then call to order Biotrue for astigmatism    If visual acuity (VA) unstable or CL uncomfortable, reach out for order of other trials (1-day Moist for Astigmatism, Precision for astigmatism, and/or Clariti 1 Toric) and then will need appt on PU    Otherwise Follow up with Dr Pike as scheduled    If headaches do not resolve Return to check and contact PCP

## 2025-07-07 PROCEDURE — RXMED WILLOW AMBULATORY MEDICATION CHARGE

## 2025-07-08 ENCOUNTER — PHARMACY VISIT (OUTPATIENT)
Dept: PHARMACY | Facility: CLINIC | Age: 20
End: 2025-07-08
Payer: COMMERCIAL

## 2025-07-11 ENCOUNTER — DOCUMENTATION (OUTPATIENT)
Dept: OPHTHALMOLOGY | Facility: CLINIC | Age: 20
End: 2025-07-11
Payer: COMMERCIAL

## 2025-07-11 ASSESSMENT — REFRACTION_CURRENTRX
OD_SPHERE: +1.75
OD_DIAMETER: 14.5
OD_AXIS: 180
OD_BASECURVE: 8.4
OD_BRAND: BIOTRUE 1 DAY FOR ASTIGMATISM
OD_CYLINDER: -0.75

## 2025-07-13 PROCEDURE — RXMED WILLOW AMBULATORY MEDICATION CHARGE

## 2025-07-14 ENCOUNTER — PHARMACY VISIT (OUTPATIENT)
Dept: PHARMACY | Facility: CLINIC | Age: 20
End: 2025-07-14
Payer: COMMERCIAL

## 2025-07-28 ENCOUNTER — APPOINTMENT (OUTPATIENT)
Dept: OPHTHALMOLOGY | Age: 20
End: 2025-07-28
Payer: COMMERCIAL

## 2025-07-28 ENCOUNTER — TELEPHONE (OUTPATIENT)
Dept: GASTROENTEROLOGY | Facility: CLINIC | Age: 20
End: 2025-07-28

## 2025-07-28 NOTE — TELEPHONE ENCOUNTER
I again reviewed the pathology with the patient and her mother and reaffirmed that she likely has mild Crohn's colitis and enteritis.  At this point we will hold off on medication and observe

## 2025-07-31 ENCOUNTER — APPOINTMENT (OUTPATIENT)
Dept: OPHTHALMOLOGY | Age: 20
End: 2025-07-31
Payer: COMMERCIAL

## 2025-08-11 ENCOUNTER — TELEPHONE (OUTPATIENT)
Dept: GASTROENTEROLOGY | Facility: CLINIC | Age: 20
End: 2025-08-11
Payer: COMMERCIAL

## 2025-08-13 PROCEDURE — RXMED WILLOW AMBULATORY MEDICATION CHARGE

## 2025-08-20 ENCOUNTER — OFFICE VISIT (OUTPATIENT)
Dept: GASTROENTEROLOGY | Facility: CLINIC | Age: 20
End: 2025-08-20
Payer: COMMERCIAL

## 2025-08-20 VITALS — WEIGHT: 131 LBS | BODY MASS INDEX: 22.36 KG/M2 | HEIGHT: 64 IN

## 2025-08-20 DIAGNOSIS — K59.04 CHRONIC IDIOPATHIC CONSTIPATION: Primary | ICD-10-CM

## 2025-08-20 PROCEDURE — 3008F BODY MASS INDEX DOCD: CPT | Performed by: INTERNAL MEDICINE

## 2025-08-20 PROCEDURE — 1036F TOBACCO NON-USER: CPT | Performed by: INTERNAL MEDICINE

## 2025-08-20 PROCEDURE — 99214 OFFICE O/P EST MOD 30 MIN: CPT | Performed by: INTERNAL MEDICINE

## 2025-08-20 ASSESSMENT — ENCOUNTER SYMPTOMS
ENDOCRINE NEGATIVE: 1
HEMATOLOGIC/LYMPHATIC NEGATIVE: 1
CONSTIPATION: 1
PSYCHIATRIC NEGATIVE: 1
RESPIRATORY NEGATIVE: 1
ALLERGIC/IMMUNOLOGIC NEGATIVE: 1
NEUROLOGICAL NEGATIVE: 1
ANAL BLEEDING: 1
CONSTITUTIONAL NEGATIVE: 1
DIARRHEA: 1
MUSCULOSKELETAL NEGATIVE: 1
EYES NEGATIVE: 1
CARDIOVASCULAR NEGATIVE: 1

## 2025-08-21 ENCOUNTER — PHARMACY VISIT (OUTPATIENT)
Dept: PHARMACY | Facility: CLINIC | Age: 20
End: 2025-08-21
Payer: COMMERCIAL

## 2025-11-25 ENCOUNTER — APPOINTMENT (OUTPATIENT)
Dept: DERMATOLOGY | Facility: CLINIC | Age: 20
End: 2025-11-25
Payer: COMMERCIAL

## 2025-12-16 ENCOUNTER — APPOINTMENT (OUTPATIENT)
Dept: DERMATOLOGY | Facility: CLINIC | Age: 20
End: 2025-12-16
Payer: COMMERCIAL

## 2026-01-02 ENCOUNTER — APPOINTMENT (OUTPATIENT)
Dept: OPHTHALMOLOGY | Facility: CLINIC | Age: 21
End: 2026-01-02
Payer: COMMERCIAL